# Patient Record
Sex: FEMALE | Race: WHITE | NOT HISPANIC OR LATINO | URBAN - NONMETROPOLITAN AREA
[De-identification: names, ages, dates, MRNs, and addresses within clinical notes are randomized per-mention and may not be internally consistent; named-entity substitution may affect disease eponyms.]

---

## 2024-11-13 PROBLEM — Z11.4 SCREENING FOR HIV (HUMAN IMMUNODEFICIENCY VIRUS): Chronic | Status: ACTIVE | Noted: 2024-11-13

## 2024-11-13 PROBLEM — Z11.59 ENCOUNTER FOR HEPATITIS C SCREENING TEST FOR LOW RISK PATIENT: Chronic | Status: ACTIVE | Noted: 2024-11-13

## 2024-11-13 PROBLEM — Z13.220 SCREENING FOR CHOLESTEROL LEVEL: Chronic | Status: ACTIVE | Noted: 2024-11-13

## 2024-11-13 PROBLEM — Z00.00 WELL ADULT EXAM: Chronic | Status: ACTIVE | Noted: 2024-11-13

## 2024-11-13 PROBLEM — Z13.1 SCREENING FOR DIABETES MELLITUS: Chronic | Status: ACTIVE | Noted: 2024-11-13

## 2024-11-13 NOTE — PROGRESS NOTES
Subjective      HPI:          Sandi Cash is a 29 y.o. female 29 y.o. is here today for PE/health maintenance      No chief complaint on file.    Pt last seen 2020    Was Dr Arellano pt       Pt is NEW PATIENT to the office today    Who is GYN?  Last pap?          Has pt had Gardisil vaccine?        Influenza--discussed      What specialists are  pt seeing?  Name -   Diagnosis-  Medications prescribed-                 [place]PTFS recommend  laboratory  screening for HIV in patients ages 15-65    [place]PTFS recommend  laboratory screening for Hepatitis C for all adults ages 18- 79 years.  Discussed both                        Health Maintenance Topic       Topic Date     Yearly Adult Physical today    HIV Screening never    Hepatitis C Screening never    Cervical Cancer Screening            Health Maintenance Topics with due status: Not Due       Topic Last Completion Date    Zoster Vaccines 06/12/2008    DTaP/Tdap/Td Vaccines 08/06/2019     Health Maintenance Topics with due status: Completed       Topic Last Completion Date    Hepatitis B Vaccines 01/02/1998    MMR Vaccines 09/26/2000    Varicella Vaccines 06/12/2008    Hepatitis A Vaccines 04/05/2013     Health Maintenance Topics with due status: Aged Out       Topic Date Due    HIB Vaccines Aged Out    Meningococcal Vaccine Aged Out    Rotavirus Vaccines Aged Out    HPV Vaccines Aged Out    Pneumococcal Vaccine: Pediatrics (0 to 5 Years) and At-Risk Patients (6 to 64 Years) Aged Out                   There is no immunization history on file for this patient.      Social History     Tobacco Use   Smoking Status Not on file   Smokeless Tobacco Not on file                 Social History     Substance and Sexual Activity   Alcohol Use Not on file                  No current outpatient medications on file.      Review of Systems      Review of Systems        Objective        PE:          There were no vitals taken for this visit.                 Pt is A and O x3, NAD  Head-  normocephalic and atraumatic,   EYES- conjunctiva- normal   lids- normal  EARS/NOSE- TM's normal, nasopharynx- normal and atraumatic  OROPHARYNX- normal  NECK- supple, FROM  THYROID- NT, normal size, no nodule noted  LYMPH- no cervical lymph nodes palpated   CV- RRR without murmur  PULM- CTA bilaterally, normal respiratory effort  RESPIRATORY EFFORT- normal , no retractions or nasal flaring   ABD- normoactive BS's , soft , NT, no hepatosplenomegaly palpated  EXT- no edema,NT  SKIN- no abnormal skin lesions noted  NEURO- no focal deficits  PSYCH- pleasant, normal judgement and insight    BP Readings from Last 3 Encounters:   No data found for BP         Wt Readings from Last 3 Encounters:   No data found for Wt         BMI Readings from Last 3 Encounters:   No data found for BMI       The number and complexity of problems addressed is considered moderate.  The amount and/or complexity of data reviewed and analyzed is considered moderate. The risk of complications and/or morbidity/mortality of patient is considered moderate. Overall, this patient encounter is considered a moderate risk visit.        Assessment/Plan      Assessment & Plan  Well adult exam         Screening for diabetes mellitus         Screening for cholesterol level         Encounter for hepatitis C screening test for low risk patient         Screening for HIV (human immunodeficiency virus)                    Follow up 12 months- PE           Recommendations for women annual wellness exam:   Make sure screenings for cervical and breast cancer are up to date if applicable- pap smears age 21-65  mammogram starting at age 35- 40 or sooner if positive family history of breast cancer   colonoscopy at age 45, earlier if positive family history for breast or colon cancer   Screen for osteoporosis with DXA bone scan starting at age 65 or sooner if risk factors present (long term steroid use, smoking, heavy alcohol use, history of fracture, rheumatoid  arthritis, low body weight, family history of hip fracture)  Screening for lung cancer with low-dose CT in all adults age 50-80 who have a 20 pack-year smoking history and currently smoke or have quit within the past 15 years; and are symptom free/no prior pulmonary diagnosis  Gardisil vaccine age 9-26 years of age   Follow a healthy diet (Examples, Dash diet, Mediterranean diet)  Exercise 150 minutes per week   Maintain healthy weight (BMI < 25)  Do not smoke   Alcohol in moderation (up to 1 drink/day)  Get enough sleep (7-8 hours/night)  Take a prenatal vitamin with folic acid if possibility of pregnancy   Make sure immunizations are up to date   Recommend minimum 1,000 mg calcium and 600-800 IU vitamin D daily (combination of diet + supplement)- unless there is a contraindication   Visit dentist twice yearly      If you are less than 60 years old, have diabetes mellitus, or chronic kidney disease, your goal blood pressure is < 140/90.  If you are older than 60 years old and do not have diabetes or kidney disease, your goal blood pressure is < 150/90.

## 2024-11-15 ENCOUNTER — APPOINTMENT (OUTPATIENT)
Dept: PRIMARY CARE | Facility: CLINIC | Age: 29
End: 2024-11-15

## 2024-11-15 DIAGNOSIS — Z13.220 SCREENING FOR CHOLESTEROL LEVEL: Chronic | ICD-10-CM

## 2024-11-15 DIAGNOSIS — Z11.59 ENCOUNTER FOR HEPATITIS C SCREENING TEST FOR LOW RISK PATIENT: Chronic | ICD-10-CM

## 2024-11-15 DIAGNOSIS — Z00.00 WELL ADULT EXAM: Primary | Chronic | ICD-10-CM

## 2024-11-15 DIAGNOSIS — Z13.1 SCREENING FOR DIABETES MELLITUS: Chronic | ICD-10-CM

## 2024-11-15 DIAGNOSIS — Z11.4 SCREENING FOR HIV (HUMAN IMMUNODEFICIENCY VIRUS): Chronic | ICD-10-CM
